# Patient Record
Sex: FEMALE | Race: WHITE | Employment: FULL TIME | URBAN - METROPOLITAN AREA
[De-identification: names, ages, dates, MRNs, and addresses within clinical notes are randomized per-mention and may not be internally consistent; named-entity substitution may affect disease eponyms.]

---

## 2022-03-04 PROBLEM — F50.2 BULIMIA NERVOSA: Status: ACTIVE | Noted: 2022-03-04

## 2022-03-04 PROBLEM — F32.A DEPRESSION: Status: ACTIVE | Noted: 2022-03-04

## 2022-03-07 ENCOUNTER — OFFICE VISIT (OUTPATIENT)
Dept: OBGYN CLINIC | Age: 27
End: 2022-03-07
Payer: COMMERCIAL

## 2022-03-07 VITALS — DIASTOLIC BLOOD PRESSURE: 72 MMHG | WEIGHT: 126.4 LBS | SYSTOLIC BLOOD PRESSURE: 114 MMHG

## 2022-03-07 DIAGNOSIS — N90.89 VULVAR LESION: ICD-10-CM

## 2022-03-07 PROCEDURE — 99203 OFFICE O/P NEW LOW 30 MIN: CPT | Performed by: OBSTETRICS & GYNECOLOGY

## 2022-03-07 RX ORDER — ASCORBIC ACID 250 MG
TABLET ORAL
COMMUNITY

## 2022-03-07 NOTE — PROGRESS NOTES
Vulvar lesion evaluation    Kilo Stanley is a 27 y.o. female  No LMP recorded. who presents with a lesion on her vulva. She noticed it 3 days ago. The lesion has not shown the following change: none. No new lesions have developed. No new sex partners. Stable relationship x2yr. She reports the following associated symptoms: none. She denies the following associated symptoms: itching, pain, redness, drainage, swelling, irritation. Aggravating factors: none. Alleviating factors: none. Past Medical History:   Diagnosis Date    Bulimia nervosa 3/4/2022    Depression 3/4/2022    Right ovarian cyst 2016     Past Surgical History:   Procedure Laterality Date    HX WISDOM TEETH EXTRACTION  05/2020     Social History     Occupational History    Occupation: Teacher [employer]   Tobacco Use    Smoking status: Never Smoker    Smokeless tobacco: Never Used   Substance and Sexual Activity    Alcohol use: Yes     Comment: some days    Drug use: Never    Sexual activity: Yes     Partners: Male     Birth control/protection: Condom     Family History   Problem Relation Age of Onset    Depression Father     High Cholesterol Father     Hypertension Father     Stroke Maternal Grandfather     Depression Paternal Grandfather     Hypertension Paternal Grandfather     Depression Paternal Aunt        No Known Allergies  Prior to Admission medications    Medication Sig Start Date End Date Taking? Authorizing Provider   ascorbic acid, vitamin C, (Vitamin C) 250 mg tablet Take  by mouth. Yes Provider, Historical   ade, Zingiber officinalis, 250 mg cap Take  by mouth.    Yes Provider, Historical        Review of Systems: History obtained from the patient  Constitutional: negative for weight loss, fever, night sweats  GI: negative for change in bowel habits, abdominal pain, black or bloody stools  : negative for frequency, dysuria, hematuria, vaginal discharge  MSK: negative for back pain, joint pain, muscle pain  Skin: negative for itching, rash, hives elsewhere  Neuro: negative for dizziness, headache, confusion, weakness  Psych: negative for anxiety, depression, change in mood  Heme/lymph: negative for bleeding, bruising, pallor    Objective:  Visit Vitals  /72   Wt 126 lb 6.4 oz (57.3 kg)       Physical Exam:   PHYSICAL EXAMINATION    Constitutional  · Appearance: well-nourished, well developed, alert, in no acute distress    Gastrointestinal  · Abdominal Examination: abdomen non-tender to palpation, normal bowel sounds, no masses present  · Liver and spleen: no hepatomegaly present, spleen not palpable  · Hernias: no hernias identified    Genitourinary  · External Genitalia: normal appearance for age, no discharge present, no tenderness present, 4mm darkened bruise like lesion on left labia minora. Benign appearing. · Vagina: normal vaginal vault without central or paravaginal defects, no discharge present, no inflammatory lesions present, no masses present  · Bladder: non-tender to palpation  · Urethra: appears normal  · Cervix: normal   · Uterus: normal size, shape and consistency  · Adnexa: no adnexal tenderness present, no adnexal masses present  · Perineum: perineum within normal limits, no evidence of trauma, no rashes or skin lesions present  · Anus: anus within normal limits, no hemorrhoids present  · Inguinal Lymph Nodes: no lymphadenopathy present    Skin  · General Inspection: no rash, no lesions identified    Neurologic/Psychiatric  · Mental Status:  · Orientation: grossly oriented to person, place and time  · Mood and Affect: mood normal, affect appropriate    Assessment:   Small labial lesion benign appearance. Question bruise    Plan:   FU 1 mo for annual.  Will recheck lesion if present can biopsy and remove. RTO prn if symptoms persist or worsen. Instructions given to pt.     Nayeli Busch MD

## 2022-03-09 RX ORDER — MELOXICAM 15 MG/1
15 TABLET ORAL DAILY
COMMUNITY
Start: 2021-06-30

## 2022-03-18 PROBLEM — F50.2 BULIMIA NERVOSA: Status: ACTIVE | Noted: 2022-03-04

## 2022-03-19 PROBLEM — F32.A DEPRESSION: Status: ACTIVE | Noted: 2022-03-04

## 2022-03-19 PROBLEM — N90.89 VULVAR LESION: Status: ACTIVE | Noted: 2022-03-07

## 2022-04-07 ENCOUNTER — OFFICE VISIT (OUTPATIENT)
Dept: OBGYN CLINIC | Age: 27
End: 2022-04-07
Payer: COMMERCIAL

## 2022-04-07 VITALS
HEIGHT: 64 IN | DIASTOLIC BLOOD PRESSURE: 77 MMHG | SYSTOLIC BLOOD PRESSURE: 134 MMHG | BODY MASS INDEX: 21.68 KG/M2 | WEIGHT: 127 LBS

## 2022-04-07 DIAGNOSIS — D18.03 HEPATIC HEMANGIOMA: ICD-10-CM

## 2022-04-07 DIAGNOSIS — N90.89 VULVAR LESION: ICD-10-CM

## 2022-04-07 DIAGNOSIS — Z01.419 ENCOUNTER FOR GYNECOLOGICAL EXAMINATION WITHOUT ABNORMAL FINDING: Primary | ICD-10-CM

## 2022-04-07 PROCEDURE — 99395 PREV VISIT EST AGE 18-39: CPT | Performed by: OBSTETRICS & GYNECOLOGY

## 2022-04-07 NOTE — PATIENT INSTRUCTIONS
Well Visit, Ages 25 to 48: Care Instructions  Overview     Well visits can help you stay healthy. Your doctor has checked your overall health and may have suggested ways to take good care of yourself. Your doctor also may have recommended tests. At home, you can help prevent illness with healthy eating, regular exercise, and other steps. Follow-up care is a key part of your treatment and safety. Be sure to make and go to all appointments, and call your doctor if you are having problems. It's also a good idea to know your test results and keep a list of the medicines you take. How can you care for yourself at home? · Get screening tests that you and your doctor decide on. Screening helps find diseases before any symptoms appear. · Eat healthy foods. Choose fruits, vegetables, whole grains, protein, and low-fat dairy foods. Limit fat, especially saturated fat. Reduce salt in your diet. · Limit alcohol. If you are a man, have no more than 2 drinks a day or 14 drinks a week. If you are a woman, have no more than 1 drink a day or 7 drinks a week. · Get at least 30 minutes of physical activity on most days of the week. Walking is a good choice. You also may want to do other activities, such as running, swimming, cycling, or playing tennis or team sports. Discuss any changes in your exercise program with your doctor. · Reach and stay at a healthy weight. This will lower your risk for many problems, such as obesity, diabetes, heart disease, and high blood pressure. · Do not smoke or allow others to smoke around you. If you need help quitting, talk to your doctor about stop-smoking programs and medicines. These can increase your chances of quitting for good. · Care for your mental health. It is easy to get weighed down by worry and stress. Learn strategies to manage stress, like deep breathing and mindfulness, and stay connected with your family and community.  If you find you often feel sad or hopeless, talk with your doctor. Treatment can help. · Talk to your doctor about whether you have any risk factors for sexually transmitted infections (STIs). You can help prevent STIs if you wait to have sex with a new partner (or partners) until you've each been tested for STIs. It also helps if you use condoms (male or female condoms) and if you limit your sex partners to one person who only has sex with you. Vaccines are available for some STIs, such as HPV. · Use birth control if it's important to you to prevent pregnancy. Talk with your doctor about the choices available and what might be best for you. · If you think you may have a problem with alcohol or drug use, talk to your doctor. This includes prescription medicines (such as amphetamines and opioids) and illegal drugs (such as cocaine and methamphetamine). Your doctor can help you figure out what type of treatment is best for you. · Protect your skin from too much sun. When you're outdoors from 10 a.m. to 4 p.m., stay in the shade or cover up with clothing and a hat with a wide brim. Wear sunglasses that block UV rays. Even when it's cloudy, put broad-spectrum sunscreen (SPF 30 or higher) on any exposed skin. · See a dentist one or two times a year for checkups and to have your teeth cleaned. · Wear a seat belt in the car. When should you call for help? Watch closely for changes in your health, and be sure to contact your doctor if you have any problems or symptoms that concern you. Where can you learn more? Go to http://www.[employer].com/  Enter P072 in the search box to learn more about \"Well Visit, Ages 25 to 48: Care Instructions. \"  Current as of: October 6, 2021               Content Version: 13.2  © 2211-0515 [employer], [employer]. Care instructions adapted under license by [employer] (which disclaims liability or warranty for this information).  If you have questions about a medical condition or this instruction, always ask your healthcare professional. [place] [address] any warranty or liability for your use of this information.

## 2022-04-07 NOTE — PROGRESS NOTES
ANNUAL EXAM AGES 18-39    Shabana Tiffany is a [de-identified] P5,  27 y.o. female   Patient's last menstrual period was 03/14/2022 (approximate). She presents for her annual checkup. She is having no significant problems. From a Gyn perspective. She has had some rectal prolapse she notes after bowel movements. She thinks this is due to bulemia and constipation issues she had in college. She just wants another look at the vulvar lesion she had at previous visit. Pt states it has not changed and looks exactly the same. Denies any drainage or leakage. Pt states the lesion has not changed in size. Menstrual status:  Her periods are heavy to light in flow. She is using one to three pads or tampons per day, usually regular with a 26-32 day interval with 3-7 day duration. She does not have dysmenorrhea. She reports no premenstrual symptoms. Contraception:  The current method of family planning is condoms. Sexual history:  She  reports being sexually active and has had partner(s) who are male. She reports using the following method of birth control/protection: Condom. Medical conditions:  Since her last annual GYN exam about one year ago, she has had the following changes in her health history: none. Pap and Mammogram History:  Her most recent Pap smear was normal, obtained 1 year(s) ago, per patient. The patient has never had a mammogram.    Gyn Flowsheet:    GYN HISTORY 4/7/2022 3/7/2022   Pap Date 1/29/2021 1/29/2021   Pap Results WNL WNL   STD History None -       Breast Cancer History:  none    Medical History:  Patient Active Problem List   Diagnosis Code    Bulimia nervosa F50.2    Depression F32. A    Vulvar lesion N90.89    Hepatic hemangioma D18.03     Past Medical History:   Diagnosis Date    Bulimia nervosa 2018    Depression 2018    Hepatic hemangioma 10/1/2016    1.0 x 0.8 cm    Right ovarian cyst 2016    4.2 cm     Past Surgical History:   Procedure Laterality Date     WISMIGUEL ANGEL TEETH EXTRACTION  2020     OB History    Para Term  AB Living   0 0 0 0 0 0   SAB IAB Ectopic Molar Multiple Live Births   0 0 0 0 0 0     Social History     Socioeconomic History    Marital status: SINGLE   Occupational History    Occupation: Teacher [employer]   Tobacco Use    Smoking status: Never Smoker    Smokeless tobacco: Never Used   Substance and Sexual Activity    Alcohol use: Yes     Comment: some days    Drug use: Never    Sexual activity: Yes     Partners: Male     Birth control/protection: Condom   Social History Narrative    3/22 Engaged      Family History   Problem Relation Age of Onset    Depression Father     High Cholesterol Father     Hypertension Father     Stroke Maternal Grandfather     Depression Paternal Grandfather     Hypertension Paternal Grandfather     Depression Paternal Aunt      Current Outpatient Medications on File Prior to Visit   Medication Sig Dispense Refill    ascorbic acid, vitamin C, (Vitamin C) 250 mg tablet Take  by mouth.  ade, Zingiber officinalis, 250 mg cap Take  by mouth.  meloxicam (MOBIC) 15 mg tablet Take 15 mg by mouth daily. No current facility-administered medications on file prior to visit.      No Known Allergies    Review of Systems:  History obtained from the patient-negative for:  Constitutional: weight loss, fever, night sweats  HEENT: hearing loss, earache, congestion, snoring, sorethroat  CV: chest pain, palpitations, edema  Resp: cough, shortness of breath, wheezing  Breast: breast lumps, nipple discharge, galactorrhea  GI: change in bowel habits, abdominal pain, black or bloody stools  : frequency, dysuria, hematuria, vaginal discharge  MSK: back pain, joint pain, muscle pain  Skin: itching, rash, hives  Neuro: dizziness, headache, confusion, weakness  Psych: anxiety, depression, change in mood  Heme/lymph: bleeding, bruising, pallor    Physical Exam  Visit Vitals  /77   Ht 5' 4\" (1.626 m)   Wt 127 lb (57.6 kg)   LMP 03/14/2022 (Approximate)   BMI 21.80 kg/m²       Constitutional  · Appearance: well-nourished, well developed, alert, in no acute distress    HENT  · Head and Face: appears normal    Neck  · Inspection/Palpation: normal appearance, no masses or tenderness  · Lymph Nodes: no lymphadenopathy present  · Thyroid: gland size normal, nontender, no nodules or masses present on palpation    Chest  · Respiratory Effort: breathing unlabored  · Auscultation: normal breath sounds    Cardiovascular  · Heart:  · Auscultation: regular rate and rhythm without murmur    Breasts  · Inspection of Breasts: breasts symmetrical, no skin changes, no discharge present, nipple appearance normal, no skin retraction present  · Palpation of Breasts and Axillae: no masses present on palpation, no breast tenderness, moderate FC changes  · Axillary Lymph Nodes: no lymphadenopathy present    Gastrointestinal  · Abdominal Examination: abdomen non-tender to palpation, normal bowel sounds, no masses present  · Liver and spleen: no hepatomegaly present, spleen not palpable  · Hernias: no hernias identified    Genitourinary  · External Genitalia: normal appearance for age, no discharge present, no tenderness present, no inflammatory lesions present, a 3-4 cm darkish area on the left labia minora is unchanged,  no masses present, no atrophy present  · Vagina: normal vaginal vault without central or paravaginal defects, no discharge present, no inflammatory lesions present, no masses present  · Bladder: non-tender to palpation  · Urethra: appears normal  · Cervix: normal   · Uterus: normal size, shape and consistency  · Adnexa: no adnexal tenderness present, no adnexal masses present  · Perineum: perineum within normal limits, no evidence of trauma, no rashes or skin lesions present  · Anus: anus within normal limits, no hemorrhoids present  · Inguinal Lymph Nodes: no lymphadenopathy present    Skin  · General Inspection: no rash, no lesions identified    Neurologic/Psychiatric  · Mental Status:  · Orientation: grossly oriented to person, place and time  · Mood and Affect: mood normal, affect appropriate    . Assessment:  Routine gynecologic examination  Her current medical status is satisfactory with no evidence of significant gynecologic issues.   Suspect hemorrhoid  Labial lesion (darkend area 3-4mm no change)    Plan:  Counseled re: diet, exercise, healthy lifestyle  Return for yearly wellness visits  Rec screening mammo at 42

## 2022-04-12 LAB
C TRACH RRNA CVX QL NAA+PROBE: NEGATIVE
CYTOLOGIST CVX/VAG CYTO: NORMAL
CYTOLOGY CVX/VAG DOC CYTO: NORMAL
CYTOLOGY CVX/VAG DOC THIN PREP: NORMAL
DX ICD CODE: NORMAL
LABCORP, 190119: NORMAL
Lab: NORMAL
N GONORRHOEA RRNA CVX QL NAA+PROBE: NEGATIVE
OTHER STN SPEC: NORMAL
STAT OF ADQ CVX/VAG CYTO-IMP: NORMAL
T VAGINALIS RRNA SPEC QL NAA+PROBE: NEGATIVE

## 2023-04-10 PROBLEM — F42.9 OBSESSIVE-COMPULSIVE DISORDER: Status: ACTIVE | Noted: 2022-10-31

## 2023-04-10 PROBLEM — F41.1 GENERALIZED ANXIETY DISORDER: Status: ACTIVE | Noted: 2022-10-31

## 2023-04-10 PROBLEM — E61.1 DIETARY IRON DEFICIENCY WITHOUT ANEMIA: Status: ACTIVE | Noted: 2022-10-31

## 2023-04-10 PROBLEM — Z80.3 FAMILY HISTORY OF BREAST CANCER: Status: ACTIVE | Noted: 2023-04-10

## 2023-06-29 ENCOUNTER — NURSE ONLY (OUTPATIENT)
Age: 28
End: 2023-06-29

## 2023-06-29 DIAGNOSIS — N91.2 AMENORRHEA: Primary | ICD-10-CM

## 2023-06-30 LAB — HCG INTACT+B SERPL-ACNC: 851 MIU/ML

## 2023-07-02 LAB — HCG INTACT+B SERPL-ACNC: 2090 MIU/ML
